# Patient Record
Sex: MALE | Race: BLACK OR AFRICAN AMERICAN | NOT HISPANIC OR LATINO | Employment: OTHER | URBAN - METROPOLITAN AREA
[De-identification: names, ages, dates, MRNs, and addresses within clinical notes are randomized per-mention and may not be internally consistent; named-entity substitution may affect disease eponyms.]

---

## 2022-01-13 DIAGNOSIS — M25.519 SHOULDER PAIN: Primary | ICD-10-CM

## 2022-05-02 DIAGNOSIS — M54.50 LOW BACK PAIN: Primary | ICD-10-CM

## 2022-06-03 ENCOUNTER — CLINICAL SUPPORT (OUTPATIENT)
Dept: REHABILITATION | Facility: HOSPITAL | Age: 72
End: 2022-06-03
Payer: OTHER GOVERNMENT

## 2022-06-03 DIAGNOSIS — M25.511 CHRONIC PAIN OF BOTH SHOULDERS: ICD-10-CM

## 2022-06-03 DIAGNOSIS — G89.29 CHRONIC BILATERAL LOW BACK PAIN WITHOUT SCIATICA: Primary | ICD-10-CM

## 2022-06-03 DIAGNOSIS — M25.512 CHRONIC PAIN OF BOTH SHOULDERS: ICD-10-CM

## 2022-06-03 DIAGNOSIS — G89.29 CHRONIC PAIN OF BOTH SHOULDERS: ICD-10-CM

## 2022-06-03 DIAGNOSIS — M54.50 CHRONIC BILATERAL LOW BACK PAIN WITHOUT SCIATICA: Primary | ICD-10-CM

## 2022-06-03 PROCEDURE — 97811 ACUP 1/> W/O ESTIM EA ADD 15: CPT

## 2022-06-03 PROCEDURE — 97810 ACUP 1/> WO ESTIM 1ST 15 MIN: CPT

## 2022-06-03 NOTE — PROGRESS NOTES
"  Acupuncture Evaluation Note     Name: Cole Winston  [facility] Number: [id number]    TCM Diagnosis: Qi and Blood stagnated in Shoulder and Lower Back  Physician: Yanelis Lyons MD    Date of Service: 6/3/2022     Medical Diagnosis: Shoulder Pain and Lower Back Pain  Evaluation Date: 6/3/2022  Plan of Care Certification Period: 12  Visit #/Visits authorized: 1/ 12    Precautions: Standard    Subjective     Chief Concern: Right Frozen shoulder, Left Shoulder Pain, and Lower Back Pain    Onset:  Right Shoulder after injury in 1972 become frozen shoulder, Left Shoulder began to have pain 2 years ago. Lower Back Pain started 2 months ago                 Progression since onset:  has worsened    Aggravating Factors:  movement   Relieving Factors:  rest    Quality:  Aching and Dull    Radiation: None    Severity:  7    Frequency:  continuously     Treatments Tried:  Therapy  and Surgery    Diet/Taste/Fluids:  in general, a "healthy" diet  /is drinking plenty of fluids/    Digestion:  WNL    Head and Body: WNL    Sleep: Not good because of Pain    Exercise:  Do some exercise    Energy Levels:  6 / 10    Emotional Symptoms:  None      Objective     Tongue:  Red has teeth quan with white fur    Pulse:  Deep    Observation:  Skin in the Shoulder and Back is very hard    Treatment     Treatment:  Invigorate the Qi and Blood in Shoulder and Lower Back    Acupuncture points used:    Bilateral: UB 25, UB 26, UB 27, Niraj Felipe,    Left: GB 21, LI 14, LI 15, LI 16, Windy around left shoulder 3+    NEEDLES IN: 15    NEEDLES OUT: 15    NEEDLES W/O STIM  AT: 10:40 AM    NEEDLES W/O STIM REMOVED AT: !1: 10 AM      Assessment     1. Patient felt good     Cause of Condition: Injury and chronic muscle damage    Associated Risk Factors:  None    Traditional Chinese Medicine Diagnosis:  Qi and Blood stagnated in Shoulder and Lower Back    Patient prognosis is Fair. Because the right shoulder has become frozen for too long time    Patient will " continue to benefit from acupuncture treatment to address the deficits listed in the problem list box on initial evaluation, provide patient family education and to maximize pt's level of independence in the home and community environment.     Patient's spiritual, cultural and educational needs considered and pt agreeable to plan of care and goals.     Anticipated barriers to treatment: None    Plan     Recommend 1 sessions per week for 12 sessions then re-assess.      Recommendations:  PT    Education:  Patient understands the nature of Acupuncture Treatment

## 2022-06-09 ENCOUNTER — CLINICAL SUPPORT (OUTPATIENT)
Dept: REHABILITATION | Facility: HOSPITAL | Age: 72
End: 2022-06-09
Payer: OTHER GOVERNMENT

## 2022-06-09 DIAGNOSIS — M25.511 CHRONIC PAIN OF BOTH SHOULDERS: ICD-10-CM

## 2022-06-09 DIAGNOSIS — M54.50 CHRONIC BILATERAL LOW BACK PAIN WITHOUT SCIATICA: Primary | ICD-10-CM

## 2022-06-09 DIAGNOSIS — G89.29 CHRONIC PAIN OF BOTH SHOULDERS: ICD-10-CM

## 2022-06-09 DIAGNOSIS — G89.29 CHRONIC BILATERAL LOW BACK PAIN WITHOUT SCIATICA: Primary | ICD-10-CM

## 2022-06-09 DIAGNOSIS — M25.512 CHRONIC PAIN OF BOTH SHOULDERS: ICD-10-CM

## 2022-06-09 PROCEDURE — 97811 ACUP 1/> W/O ESTIM EA ADD 15: CPT

## 2022-06-09 PROCEDURE — 97810 ACUP 1/> WO ESTIM 1ST 15 MIN: CPT

## 2022-06-09 NOTE — PROGRESS NOTES
Acupuncture Treatment Note     Name: Cole Winston  [facility] Number: [id number]    Traditional Chinese Medicine Diagnosis:  Qi and Blood stagnated in Shoulder and Lower Back  Physician: Yanelis Lyons MD    Date of Service: 6/9/2022     Medical Diagnosis: Shoulder Pain and Lower Back Pain    Evaluation Date: 6/3/2022    Plan of Care Certification Period: 12  Visit #/Visits authorized: 2 / 12     Precautions: Diabetes    Subjective     Chief Complaint:  Right Frozen shoulder, Left Shoulder Pain, and Lower Back Pain    Response to Previous Treatment:  Left Shoulder pain reduced a lot    Quality of Symptoms (Better/Worse):  Better    Other Condition/Symptoms:  Since the patient has long time Diabetes, his skin on Back Shoulder become very hard.    Objective      New Findings:  None    Treatment Principles:  Invigorate the Qi and Blood in Shoulder and Lower Back    Acupuncture Points:     Bilateral: UB 25, UB 26, UB 27, UB 28, UB 29, UB 30, Niraj Felipe, Phillip Tuo Oliva Ji + 4 , UB 54, GB 30, Windy around GB30 +4, GB 31    Left: GB 21, LI 14, LI 15, LI 16, Windy around left shoulder 2+    Right: GB32, GB 32    NEEDLES W/O STIM  AT: 10 40 AM    NEEDLES W/O STIM REMOVED AT: 11: 20 AM    #NEEDLES IN: 44    #NEEDLES OUT: 44    Other Traditional Chinese Medicine Modalities:  Cupping    Recommendations:  PT exercise    Education:  Patient is aware of cumulative effect of acupuncture      Assessment      Analysis of Treatment:  Very good    Pt prognosis is Good.     Patient will continue to benefit from acupuncture treatment to address the deficits listed in the problem list box on initial evaluation, provide patient family education and to maximize pt's level of independence in the home and community environment.     Patient's spiritual, cultural and educational needs considered and pt agreeable to plan of care and goals.     Anticipated barriers to treatment: None    Plan     Recommend   1-2     /week for12   treatments and  re-assess.

## 2022-06-17 ENCOUNTER — CLINICAL SUPPORT (OUTPATIENT)
Dept: REHABILITATION | Facility: HOSPITAL | Age: 72
End: 2022-06-17
Payer: OTHER GOVERNMENT

## 2022-06-17 DIAGNOSIS — G89.29 CHRONIC PAIN OF BOTH SHOULDERS: ICD-10-CM

## 2022-06-17 DIAGNOSIS — M25.511 CHRONIC PAIN OF BOTH SHOULDERS: ICD-10-CM

## 2022-06-17 DIAGNOSIS — M54.50 CHRONIC BILATERAL LOW BACK PAIN WITHOUT SCIATICA: Primary | ICD-10-CM

## 2022-06-17 DIAGNOSIS — M25.512 CHRONIC PAIN OF BOTH SHOULDERS: ICD-10-CM

## 2022-06-17 DIAGNOSIS — G89.29 CHRONIC BILATERAL LOW BACK PAIN WITHOUT SCIATICA: Primary | ICD-10-CM

## 2022-06-17 PROCEDURE — 97810 ACUP 1/> WO ESTIM 1ST 15 MIN: CPT

## 2022-06-17 PROCEDURE — 97811 ACUP 1/> W/O ESTIM EA ADD 15: CPT

## 2022-06-17 NOTE — PROGRESS NOTES
Acupuncture Treatment Note     Name: Cole Winston  [facility] Number: [id number]    Traditional Chinese Medicine Diagnosis:  Qi and Blood stagnated in Shoulder and Lower Back  Physician: Yanelis Lyons MD    Date of Service: 6/17/2022     Medical Diagnosis: Shoulder Pain and Lower Back Pain    Evaluation Date: 6/3/2022    Plan of Care Certification Period: 12  Visit #/Visits authorized: 3 / 12     Precautions: Diabetes    Subjective     Chief Complaint:  Right Frozen shoulder, Left Shoulder Pain, and Lower Back Pain    Response to Previous Treatment:  Left Shoulder pain reduced a lot    Quality of Symptoms (Better/Worse):  Better    Other Condition/Symptoms:  Since the patient has long time Diabetes, his skin on Back Shoulder become very hard.    Objective      New Findings:  None    Treatment Principles:  Invigorate the Qi and Blood in Shoulder and Lower Back    Acupuncture Points:     Bilateral: UB 25, UB 26, , UB 29, UB 30, Niraj Felipe, Phillip Tuo Oliva Ji + 1 , UB 54, GB 30, Windy around GB30 +3, Neck Oliva Ji + 2    Left: GB 21, LI 14, LI 15, LI 16, Windy around left shoulder 2+    NEEDLES W/O STIM  AT: 11 00 AM    NEEDLES W/O STIM REMOVED AT: 11: 40 AM    #NEEDLES IN: 32    #NEEDLES OUT: 32    Other Traditional Chinese Medicine Modalities:  None    Recommendations:  PT exercise    Education:  Patient is aware of cumulative effect of acupuncture      Assessment      Analysis of Treatment:  Very good    Pt prognosis is Good.     Patient will continue to benefit from acupuncture treatment to address the deficits listed in the problem list box on initial evaluation, provide patient family education and to maximize pt's level of independence in the home and community environment.     Patient's spiritual, cultural and educational needs considered and pt agreeable to plan of care and goals.     Anticipated barriers to treatment: None    Plan     Recommend   1-2     /week for12   treatments and re-assess.

## 2022-06-24 ENCOUNTER — CLINICAL SUPPORT (OUTPATIENT)
Dept: REHABILITATION | Facility: HOSPITAL | Age: 72
End: 2022-06-24
Payer: OTHER GOVERNMENT

## 2022-06-24 DIAGNOSIS — M54.50 CHRONIC BILATERAL LOW BACK PAIN WITHOUT SCIATICA: Primary | ICD-10-CM

## 2022-06-24 DIAGNOSIS — M25.511 CHRONIC PAIN OF BOTH SHOULDERS: ICD-10-CM

## 2022-06-24 DIAGNOSIS — G89.29 CHRONIC PAIN OF BOTH SHOULDERS: ICD-10-CM

## 2022-06-24 DIAGNOSIS — M25.512 CHRONIC PAIN OF BOTH SHOULDERS: ICD-10-CM

## 2022-06-24 DIAGNOSIS — G89.29 CHRONIC BILATERAL LOW BACK PAIN WITHOUT SCIATICA: Primary | ICD-10-CM

## 2022-06-24 PROCEDURE — 97811 ACUP 1/> W/O ESTIM EA ADD 15: CPT

## 2022-06-24 PROCEDURE — 97810 ACUP 1/> WO ESTIM 1ST 15 MIN: CPT

## 2022-06-24 NOTE — PROGRESS NOTES
Acupuncture Treatment Note     Name: Cole Winston  [facility] Number: [id number]    Traditional Chinese Medicine Diagnosis:  Qi and Blood stagnated in Shoulder and Lower Back  Physician: Yanelis Lyons MD    Date of Service: 6/24/2022     Medical Diagnosis: Shoulder Pain and Lower Back Pain    Evaluation Date: 6/3/2022    Plan of Care Certification Period: 12  Visit #/Visits authorized: 4 / 12     Precautions: Diabetes    Subjective     Chief Complaint:  Right Frozen shoulder, Left Shoulder Pain, and Lower Back Pain    Response to Previous Treatment:  Left Shoulder pain reduced a lot    Quality of Symptoms (Better/Worse):  Better    Other Condition/Symptoms:  Since the patient has long time Diabetes, his skin on Back Shoulder become very hard.    Objective      New Findings:  None    Treatment Principles:  Invigorate the Qi and Blood in Shoulder and Lower Back    Acupuncture Points:     Bilateral: UB 25, UB 26, , UB 29, UB 30, Niraj Felipe, Phillip Tuo Oliva Ji + 1 , UB 54, GB 30, GB 21, LI 14, LI 15, LI 16,    Right : Windy around right shoulder 5+    NEEDLES W/O STIM  AT: 10 30 AM    NEEDLES W/O STIM REMOVED AT: 11: 10 AM    #NEEDLES IN: 29    #NEEDLES OUT: 29    Other Traditional Chinese Medicine Modalities:  None    Recommendations:  PT exercise    Education:  Patient is aware of cumulative effect of acupuncture      Assessment      Analysis of Treatment:  Very good    Pt prognosis is Good.     Patient will continue to benefit from acupuncture treatment to address the deficits listed in the problem list box on initial evaluation, provide patient family education and to maximize pt's level of independence in the home and community environment.     Patient's spiritual, cultural and educational needs considered and pt agreeable to plan of care and goals.     Anticipated barriers to treatment: None    Plan     Recommend   1-2     /week for12   treatments and re-assess.

## 2022-06-30 ENCOUNTER — CLINICAL SUPPORT (OUTPATIENT)
Dept: REHABILITATION | Facility: HOSPITAL | Age: 72
End: 2022-06-30
Payer: OTHER GOVERNMENT

## 2022-06-30 DIAGNOSIS — M25.511 CHRONIC PAIN OF BOTH SHOULDERS: ICD-10-CM

## 2022-06-30 DIAGNOSIS — M54.50 CHRONIC BILATERAL LOW BACK PAIN WITHOUT SCIATICA: Primary | ICD-10-CM

## 2022-06-30 DIAGNOSIS — G89.29 CHRONIC BILATERAL LOW BACK PAIN WITHOUT SCIATICA: Primary | ICD-10-CM

## 2022-06-30 DIAGNOSIS — M25.512 CHRONIC PAIN OF BOTH SHOULDERS: ICD-10-CM

## 2022-06-30 DIAGNOSIS — G89.29 CHRONIC PAIN OF BOTH SHOULDERS: ICD-10-CM

## 2022-06-30 PROCEDURE — 97811 ACUP 1/> W/O ESTIM EA ADD 15: CPT

## 2022-06-30 PROCEDURE — 97810 ACUP 1/> WO ESTIM 1ST 15 MIN: CPT

## 2022-06-30 NOTE — PROGRESS NOTES
Acupuncture Treatment Note     Name: Cole Winston  [facility] Number: [id number]    Traditional Chinese Medicine Diagnosis:  Qi and Blood stagnated in Shoulder and Lower Back  Physician: Yanelis Lyons MD    Date of Service: 6/30/2022     Medical Diagnosis: Shoulder Pain and Lower Back Pain    Evaluation Date: 6/3/2022    Plan of Care Certification Period: 12  Visit #/Visits authorized: 5 / 12     Precautions: Diabetes    Subjective     Chief Complaint:  Right Frozen shoulder, Left Shoulder Pain, and Lower Back Pain    Response to Previous Treatment:  Left Shoulder pain reduced a lot    Quality of Symptoms (Better/Worse):  Better    Other Condition/Symptoms:  Since the patient has long time Diabetes, his skin on  Shoulder become very hard.    Objective      New Findings:  None    Treatment Principles:  Invigorate the Qi and Blood in Shoulder and Lower Back    Acupuncture Points:     Bilateral: UB 25, UB 26, ,  Niraj Felipe, Phillip Omkaro Oliva Ji + 1 , UB 54, GB 30, GB 21, LI 15, LI 16,    Right : Windy around right shoulder 5+    NEEDLES W/O STIM  AT: 9 40 AM    NEEDLES W/O STIM REMOVED AT: 10: 20 AM    #NEEDLES IN: 23    #NEEDLES OUT: 23    Other Traditional Chinese Medicine Modalities:  None    Recommendations:  PT exercise    Education:  Patient is aware of cumulative effect of acupuncture      Assessment      Analysis of Treatment:  Very good    Pt prognosis is Good.     Patient will continue to benefit from acupuncture treatment to address the deficits listed in the problem list box on initial evaluation, provide patient family education and to maximize pt's level of independence in the home and community environment.     Patient's spiritual, cultural and educational needs considered and pt agreeable to plan of care and goals.     Anticipated barriers to treatment: None    Plan     Recommend   1-2     /week for12   treatments and re-assess.

## 2022-07-29 ENCOUNTER — CLINICAL SUPPORT (OUTPATIENT)
Dept: REHABILITATION | Facility: HOSPITAL | Age: 72
End: 2022-07-29
Payer: OTHER GOVERNMENT

## 2022-07-29 DIAGNOSIS — M25.512 CHRONIC PAIN OF BOTH SHOULDERS: ICD-10-CM

## 2022-07-29 DIAGNOSIS — M54.50 CHRONIC BILATERAL LOW BACK PAIN WITHOUT SCIATICA: Primary | ICD-10-CM

## 2022-07-29 DIAGNOSIS — G89.29 CHRONIC BILATERAL LOW BACK PAIN WITHOUT SCIATICA: Primary | ICD-10-CM

## 2022-07-29 DIAGNOSIS — M25.511 CHRONIC PAIN OF BOTH SHOULDERS: ICD-10-CM

## 2022-07-29 DIAGNOSIS — G89.29 CHRONIC PAIN OF BOTH SHOULDERS: ICD-10-CM

## 2022-07-29 PROCEDURE — 97811 ACUP 1/> W/O ESTIM EA ADD 15: CPT

## 2022-07-29 PROCEDURE — 97810 ACUP 1/> WO ESTIM 1ST 15 MIN: CPT

## 2022-07-29 NOTE — PROGRESS NOTES
Acupuncture Treatment Note     Name: Cole Winston  [facility] Number: [id number]    Traditional Chinese Medicine Diagnosis:  Qi and Blood stagnated in  Shoulder and Lower Back  Physician: Yanelis Lyons MD    Date of Service: 7/29/2022     Medical Diagnosis: Shoulder Pain and Lower Back Pain    Evaluation Date: 6/3/2022    Plan of Care Certification Period: 12  Visit #/Visits authorized: 6 / 12     Precautions: Diabetes    Subjective     Chief Complaint:  Right Frozen shoulder, Left Shoulder Pain, and Lower Back Pain    Response to Previous Treatment:  Left Shoulder pain reduced a lot    Quality of Symptoms (Better/Worse):  Better    Other Condition/Symptoms:  Since the patient has long time Diabetes, his skin on  Shoulder become very hard.    Objective      New Findings:  None    Treatment Principles:  Invigorate the Qi and Blood in Shoulder and Lower Back    Acupuncture Points:     Bilateral: UB 25, UB 26, ,  Niraj Felipe, Phillip Tuo Oliva Ji + 1 , UB 54, GB 30,     Leftt : Windy around left shoulder 8+ GB 21    NEEDLES W/O STIM  AT: 10 00 AM    NEEDLES W/O STIM REMOVED AT: 10: 40 AM    #NEEDLES IN: 18    #NEEDLES OUT: 18    Other Traditional Chinese Medicine Modalities:  None    Recommendations:  PT exercise    Education:  Patient is aware of cumulative effect of acupuncture      Assessment      Analysis of Treatment:  Very good    Pt prognosis is Good.     Patient will continue to benefit from acupuncture treatment to address the deficits listed in the problem list box on initial evaluation, provide patient family education and to maximize pt's level of independence in the home and community environment.     Patient's spiritual, cultural and educational needs considered and pt agreeable to plan of care and goals.     Anticipated barriers to treatment: None    Plan     Recommend   1-2     /week for12   treatments and re-assess.

## 2022-08-12 ENCOUNTER — CLINICAL SUPPORT (OUTPATIENT)
Dept: REHABILITATION | Facility: HOSPITAL | Age: 72
End: 2022-08-12
Payer: OTHER GOVERNMENT

## 2022-08-12 DIAGNOSIS — M25.512 CHRONIC LEFT SHOULDER PAIN: ICD-10-CM

## 2022-08-12 DIAGNOSIS — M54.50 CHRONIC LEFT-SIDED LOW BACK PAIN WITHOUT SCIATICA: Primary | ICD-10-CM

## 2022-08-12 DIAGNOSIS — G89.29 CHRONIC LEFT SHOULDER PAIN: ICD-10-CM

## 2022-08-12 DIAGNOSIS — G89.29 CHRONIC LEFT-SIDED LOW BACK PAIN WITHOUT SCIATICA: Primary | ICD-10-CM

## 2022-08-12 PROCEDURE — 97811 ACUP 1/> W/O ESTIM EA ADD 15: CPT

## 2022-08-12 PROCEDURE — 97810 ACUP 1/> WO ESTIM 1ST 15 MIN: CPT

## 2022-08-12 NOTE — PROGRESS NOTES
Acupuncture Treatment Note     Name: Cole Winston  [facility] Number: [id number]    Traditional Chinese Medicine Diagnosis:  Qi and Blood stagnated in  Left Shoulder and Left Lower Back  Physician: Yanelis Lyons MD    Date of Service: 8/12/2022     Medical Diagnosis: Left Shoulder Pain and Left Lower Back Pain    Evaluation Date: 6/3/2022    Plan of Care Certification Period: 12  Visit #/Visits authorized: 7 / 12     Precautions: Diabetes    Subjective     Chief Complaint:  Left Shoulder Pain, and Left Lower Back Pain    Response to Previous Treatment:  Good  Quality of Symptoms (Better/Worse):  Better    Other Condition/Symptoms:  None    Objective      New Findings:  None    Treatment Principles:  Invigorate the Qi and Blood in Left Shoulder and in Left Lower Back    Acupuncture Points:     Left :  Phillip Tuo Oliva Ji + 3 , UB 54, GB 30, Windy on left lower back + 4,  Windy around left shoulder 8+ GB 21    NEEDLES W/O STIM  AT: 11 00 AM    NEEDLES W/O STIM REMOVED AT: 11: 30 AM    #NEEDLES IN: 18    #NEEDLES OUT: 18    Other Traditional Chinese Medicine Modalities:  None    Recommendations:  PT exercise    Education:  Patient is aware of cumulative effect of acupuncture      Assessment      Analysis of Treatment:  Very good    Pt prognosis is Good.     Patient will continue to benefit from acupuncture treatment to address the deficits listed in the problem list box on initial evaluation, provide patient family education and to maximize pt's level of independence in the home and community environment.     Patient's spiritual, cultural and educational needs considered and pt agreeable to plan of care and goals.     Anticipated barriers to treatment: None    Plan     Recommend   1-2     /week for12   treatments and re-assess.

## 2022-08-19 ENCOUNTER — CLINICAL SUPPORT (OUTPATIENT)
Dept: REHABILITATION | Facility: HOSPITAL | Age: 72
End: 2022-08-19
Payer: OTHER GOVERNMENT

## 2022-08-19 DIAGNOSIS — M54.50 CHRONIC LEFT-SIDED LOW BACK PAIN WITHOUT SCIATICA: Primary | ICD-10-CM

## 2022-08-19 DIAGNOSIS — M25.512 CHRONIC LEFT SHOULDER PAIN: ICD-10-CM

## 2022-08-19 DIAGNOSIS — G89.29 CHRONIC LEFT-SIDED LOW BACK PAIN WITHOUT SCIATICA: Primary | ICD-10-CM

## 2022-08-19 DIAGNOSIS — G89.29 CHRONIC LEFT SHOULDER PAIN: ICD-10-CM

## 2022-08-19 PROCEDURE — 97811 ACUP 1/> W/O ESTIM EA ADD 15: CPT

## 2022-08-19 PROCEDURE — 97810 ACUP 1/> WO ESTIM 1ST 15 MIN: CPT

## 2022-08-19 NOTE — PROGRESS NOTES
Acupuncture Treatment Note     Name: Cole Winston  [facility] Number: [id number]    Traditional Chinese Medicine Diagnosis:  Qi and Blood stagnated in  Left Shoulder and Left Lower Back  Physician: Yanelis Lyons MD    Date of Service: 8/19/2022     Medical Diagnosis: Left Shoulder Pain and Left Lower Back Pain    Evaluation Date: 6/3/2022    Plan of Care Certification Period: 12  Visit #/Visits authorized: 8 / 12     Precautions: Diabetes    Subjective     Chief Complaint:  Left Shoulder Pain, and Left Lower Back Pain    Response to Previous Treatment:  Good  Quality of Symptoms (Better/Worse):  Better    Other Condition/Symptoms:  None    Objective      New Findings:  None    Treatment Principles:  Invigorate the Qi and Blood in Left Shoulder and in Left Lower Back    Acupuncture Points:     Left :  Phillip Tuo Oliva Ji + 1 , UB 54, GB 30, Windy on left lower back + 4,  Windy around left shoulder 8+ GB 21    NEEDLES W/O STIM  AT: 11 00 AM    NEEDLES W/O STIM REMOVED AT: 11: 30 AM    #NEEDLES IN: 16    #NEEDLES OUT: 16    Other Traditional Chinese Medicine Modalities:  None    Recommendations:  PT exercise    Education:  Patient is aware of cumulative effect of acupuncture      Assessment      Analysis of Treatment:  Very good    Pt prognosis is Good.     Patient will continue to benefit from acupuncture treatment to address the deficits listed in the problem list box on initial evaluation, provide patient family education and to maximize pt's level of independence in the home and community environment.     Patient's spiritual, cultural and educational needs considered and pt agreeable to plan of care and goals.     Anticipated barriers to treatment: None    Plan     Recommend   1-2     /week for12   treatments and re-assess.

## 2022-08-26 ENCOUNTER — CLINICAL SUPPORT (OUTPATIENT)
Dept: REHABILITATION | Facility: HOSPITAL | Age: 72
End: 2022-08-26
Payer: OTHER GOVERNMENT

## 2022-08-26 DIAGNOSIS — M54.50 CHRONIC LEFT-SIDED LOW BACK PAIN WITHOUT SCIATICA: Primary | ICD-10-CM

## 2022-08-26 DIAGNOSIS — M25.512 CHRONIC LEFT SHOULDER PAIN: ICD-10-CM

## 2022-08-26 DIAGNOSIS — G89.29 CHRONIC LEFT-SIDED LOW BACK PAIN WITHOUT SCIATICA: Primary | ICD-10-CM

## 2022-08-26 DIAGNOSIS — G89.29 CHRONIC LEFT SHOULDER PAIN: ICD-10-CM

## 2022-08-26 PROCEDURE — 97810 ACUP 1/> WO ESTIM 1ST 15 MIN: CPT

## 2022-08-26 PROCEDURE — 97811 ACUP 1/> W/O ESTIM EA ADD 15: CPT

## 2022-08-26 NOTE — PROGRESS NOTES
Acupuncture Treatment Note     Name: Cole Winston  [facility] Number: [id number]    Traditional Chinese Medicine Diagnosis:  Qi and Blood stagnated in  Left Shoulder and Left Lower Back  Physician: Yanelis Lyons MD    Date of Service: 8/26/2022     Medical Diagnosis: Left Shoulder Pain and Left Lower Back Pain    Evaluation Date: 6/3/2022    Plan of Care Certification Period: 12  Visit #/Visits authorized: 9 / 12     Precautions: Diabetes    Subjective     Chief Complaint:  Left Shoulder Pain, and Left Lower Back Pain    Response to Previous Treatment:  Good  Quality of Symptoms (Better/Worse):  Better    Other Condition/Symptoms:  None    Objective      New Findings:  None    Treatment Principles:  Invigorate the Qi and Blood in Left Shoulder and in Left Lower Back    Acupuncture Points:     Left :  Phillip Tuo Oliva Ji + 1 , UB 54, GB 30, Windy on left lower back + 5,  Windy around left shoulder +8,  GB 21    NEEDLES W/O STIM  AT: 11 10 AM    NEEDLES W/O STIM REMOVED AT: 11: 40 AM    #NEEDLES IN: 17    #NEEDLES OUT: 17    Other Traditional Chinese Medicine Modalities:  None    Recommendations:  PT exercise    Education:  Patient is aware of cumulative effect of acupuncture      Assessment      Analysis of Treatment:  Very good    Pt prognosis is Good.     Patient will continue to benefit from acupuncture treatment to address the deficits listed in the problem list box on initial evaluation, provide patient family education and to maximize pt's level of independence in the home and community environment.     Patient's spiritual, cultural and educational needs considered and pt agreeable to plan of care and goals.     Anticipated barriers to treatment: None    Plan     Recommend   1-2     /week for12   treatments and re-assess.

## 2022-09-02 ENCOUNTER — CLINICAL SUPPORT (OUTPATIENT)
Dept: REHABILITATION | Facility: HOSPITAL | Age: 72
End: 2022-09-02
Payer: OTHER GOVERNMENT

## 2022-09-02 DIAGNOSIS — M54.50 CHRONIC LEFT-SIDED LOW BACK PAIN WITHOUT SCIATICA: Primary | ICD-10-CM

## 2022-09-02 DIAGNOSIS — M25.512 CHRONIC LEFT SHOULDER PAIN: ICD-10-CM

## 2022-09-02 DIAGNOSIS — G89.29 CHRONIC LEFT-SIDED LOW BACK PAIN WITHOUT SCIATICA: Primary | ICD-10-CM

## 2022-09-02 DIAGNOSIS — G89.29 CHRONIC LEFT SHOULDER PAIN: ICD-10-CM

## 2022-09-02 PROCEDURE — 97810 ACUP 1/> WO ESTIM 1ST 15 MIN: CPT

## 2022-09-02 PROCEDURE — 97811 ACUP 1/> W/O ESTIM EA ADD 15: CPT

## 2022-09-02 NOTE — PROGRESS NOTES
Acupuncture Treatment Note     Name: Cole Winston  [facility] Number: [id number]    Traditional Chinese Medicine Diagnosis:  Qi and Blood stagnated in  Left Shoulder and Left Lower Back  Physician: Yanelis Lyons MD    Date of Service: 9/2/2022     Medical Diagnosis: Left Shoulder Pain and Left Lower Back Pain    Evaluation Date: 6/3/2022    Plan of Care Certification Period: 12  Visit #/Visits authorized: 10 / 12     Precautions: Diabetes    Subjective     Chief Complaint:  Left Shoulder Pain, and Left Lower Back Pain    Response to Previous Treatment:  Good  Quality of Symptoms (Better/Worse):  Better    Other Condition/Symptoms:  None    Objective      New Findings:  None    Treatment Principles:  Invigorate the Qi and Blood in Left Shoulder and in Left Lower Back    Acupuncture Points:     Left :  Phillip Tuo Oliva Ji + 1 , UB 54, GB 30, Windy on left lower back + 5,  Windy around left shoulder +8,  GB 21    NEEDLES W/O STIM  AT: 11 10 AM    NEEDLES W/O STIM REMOVED AT: 11: 40 AM    #NEEDLES IN: 17    #NEEDLES OUT: 17    Other Traditional Chinese Medicine Modalities:  None    Recommendations:  PT exercise    Education:  Patient is aware of cumulative effect of acupuncture      Assessment      Analysis of Treatment:  Very good    Pt prognosis is Good.     Patient will continue to benefit from acupuncture treatment to address the deficits listed in the problem list box on initial evaluation, provide patient family education and to maximize pt's level of independence in the home and community environment.     Patient's spiritual, cultural and educational needs considered and pt agreeable to plan of care and goals.     Anticipated barriers to treatment: None    Plan     Recommend   1-2     /week for12   treatments and re-assess.

## 2022-09-09 ENCOUNTER — CLINICAL SUPPORT (OUTPATIENT)
Dept: REHABILITATION | Facility: HOSPITAL | Age: 72
End: 2022-09-09
Payer: OTHER GOVERNMENT

## 2022-09-09 DIAGNOSIS — M25.512 CHRONIC LEFT SHOULDER PAIN: Primary | ICD-10-CM

## 2022-09-09 DIAGNOSIS — G89.29 CHRONIC LEFT SHOULDER PAIN: Primary | ICD-10-CM

## 2022-09-09 PROCEDURE — 97811 ACUP 1/> W/O ESTIM EA ADD 15: CPT

## 2022-09-09 PROCEDURE — 97810 ACUP 1/> WO ESTIM 1ST 15 MIN: CPT

## 2022-09-09 NOTE — PROGRESS NOTES
Acupuncture Treatment Note     Name: Cole Winston  [facility] Number: [id number]    Traditional Chinese Medicine Diagnosis:  Qi and Blood stagnated in  Left Shoulder     Physician: [facility]    Date of Service: 9/9/2022     Medical Diagnosis: Left Shoulder Pain   Evaluation Date: 6/3/2022    Plan of Care Certification Period: 12  Visit #/Visits authorized: 11 / 12     Precautions: Diabetes    Subjective     Chief Complaint:  Left Shoulder Pain,     Response to Previous Treatment:  Good  Quality of Symptoms (Better/Worse):  Better    Other Condition/Symptoms:  None    Objective      New Findings:  None    Treatment Principles:  Invigorate the Qi and Blood in Left Shoulder     Acupuncture Points:     Left :   Windy around left shoulder +8,  GB 21    NEEDLES W/O STIM  AT: 11 10 AM    NEEDLES W/O STIM REMOVED AT: 11: 40 AM    #NEEDLES IN: 9    #NEEDLES OUT: 9    Other Traditional Chinese Medicine Modalities:  None    Recommendations:  PT exercise    Education:  Patient is aware of cumulative effect of acupuncture      Assessment      Analysis of Treatment:  Very good    Pt prognosis is Good.     Patient will continue to benefit from acupuncture treatment to address the deficits listed in the problem list box on initial evaluation, provide patient family education and to maximize pt's level of independence in the home and community environment.     Patient's spiritual, cultural and educational needs considered and pt agreeable to plan of care and goals.     Anticipated barriers to treatment: None    Plan     Recommend   1-2     /week for12   treatments and re-assess.

## 2022-09-23 ENCOUNTER — CLINICAL SUPPORT (OUTPATIENT)
Dept: REHABILITATION | Facility: HOSPITAL | Age: 72
End: 2022-09-23
Payer: OTHER GOVERNMENT

## 2022-09-23 DIAGNOSIS — G89.29 CHRONIC LEFT SHOULDER PAIN: Primary | ICD-10-CM

## 2022-09-23 DIAGNOSIS — M25.512 CHRONIC LEFT SHOULDER PAIN: Primary | ICD-10-CM

## 2022-09-23 PROCEDURE — 97811 ACUP 1/> W/O ESTIM EA ADD 15: CPT

## 2022-09-23 PROCEDURE — 97810 ACUP 1/> WO ESTIM 1ST 15 MIN: CPT

## 2022-09-23 NOTE — PROGRESS NOTES
Acupuncture Treatment Note     Name: Cole Winston  [facility] Number: [id number]    Traditional Chinese Medicine Diagnosis:  Qi and Blood stagnated in  Left Shoulder     Physician: [facility]    Date of Service: 9/23/2022     Medical Diagnosis: Left Shoulder Pain   Evaluation Date: 6/3/2022    Plan of Care Certification Period: 12  Visit #/Visits authorized: 12 / 12     Precautions: Diabetes    Subjective     Chief Complaint:  Left Shoulder Pain,     Response to Previous Treatment:  Good  Quality of Symptoms (Better/Worse):  Better    Other Condition/Symptoms:  None    Objective      New Findings:  None    Treatment Principles:  Invigorate the Qi and Blood in Left Shoulder     Acupuncture Points:     Left :   Windy around left shoulder +10,  GB 21    NEEDLES W/O STIM  AT: 11 10 AM    NEEDLES W/O STIM REMOVED AT: 11: 40 AM    #NEEDLES IN: 11    #NEEDLES OUT: 11    Other Traditional Chinese Medicine Modalities:  None    Recommendations:  PT exercise    Education:  Patient is aware of cumulative effect of acupuncture      Assessment      Analysis of Treatment:  Very good    Pt prognosis is Good.     Patient will continue to benefit from acupuncture treatment to address the deficits listed in the problem list box on initial evaluation, provide patient family education and to maximize pt's level of independence in the home and community environment.     Patient's spiritual, cultural and educational needs considered and pt agreeable to plan of care and goals.     Anticipated barriers to treatment: None    Plan     Recommend   1-2     /week for12   treatments and re-assess.

## 2022-10-14 ENCOUNTER — CLINICAL SUPPORT (OUTPATIENT)
Dept: REHABILITATION | Facility: HOSPITAL | Age: 72
End: 2022-10-14
Payer: OTHER GOVERNMENT

## 2022-10-14 DIAGNOSIS — G89.29 CHRONIC LEFT SHOULDER PAIN: Primary | ICD-10-CM

## 2022-10-14 DIAGNOSIS — M25.512 CHRONIC LEFT SHOULDER PAIN: Primary | ICD-10-CM

## 2022-10-14 PROCEDURE — 97810 ACUP 1/> WO ESTIM 1ST 15 MIN: CPT

## 2022-10-14 PROCEDURE — 97811 ACUP 1/> W/O ESTIM EA ADD 15: CPT

## 2022-10-14 NOTE — PROGRESS NOTES
Acupuncture Treatment Note     Name: Cole Winston  [facility] Number: [id number]    Traditional Chinese Medicine Diagnosis:  Qi and Blood stagnated in  Left Shoulder     Physician: [facility]    Date of Service: 10/14/2022     Medical Diagnosis: Left Shoulder Pain   Evaluation Date: 6/3/2022    Plan of Care Certification Period: 20  Visit #/Visits authorized: 13 / 20    Precautions: Diabetes    Subjective     Chief Complaint:  Left Shoulder Pain,     Response to Previous Treatment:  Good  Quality of Symptoms (Better/Worse):  Better    Other Condition/Symptoms:  None    Objective      New Findings:  None    Treatment Principles:  Invigorate the Qi and Blood in Left Shoulder     Acupuncture Points:     Left :   Windy around left shoulder +10,  GB 21    NEEDLES W/O STIM  AT: 11 10 AM    NEEDLES W/O STIM REMOVED AT: 11: 40 AM    #NEEDLES IN: 11    #NEEDLES OUT: 11    Other Traditional Chinese Medicine Modalities:  None    Recommendations:  PT exercise    Education:  Patient is aware of cumulative effect of acupuncture      Assessment      Analysis of Treatment:  Very good    Pt prognosis is Good.     Patient will continue to benefit from acupuncture treatment to address the deficits listed in the problem list box on initial evaluation, provide patient family education and to maximize pt's level of independence in the home and community environment.     Patient's spiritual, cultural and educational needs considered and pt agreeable to plan of care and goals.     Anticipated barriers to treatment: None    Plan     Recommend   1-2     /week for12   treatments and re-assess.

## 2022-10-21 ENCOUNTER — CLINICAL SUPPORT (OUTPATIENT)
Dept: REHABILITATION | Facility: HOSPITAL | Age: 72
End: 2022-10-21
Payer: OTHER GOVERNMENT

## 2022-10-21 DIAGNOSIS — G89.29 CHRONIC LEFT SHOULDER PAIN: Primary | ICD-10-CM

## 2022-10-21 DIAGNOSIS — M25.512 CHRONIC LEFT SHOULDER PAIN: Primary | ICD-10-CM

## 2022-10-21 NOTE — PROGRESS NOTES
Acupuncture Treatment Note     Name: Cole Winston  [facility] Number: [id number]    Traditional Chinese Medicine Diagnosis:  Qi and Blood stagnated in  Left Shoulder     Physician: [facility]    Date of Service: 10/21/2022     Medical Diagnosis: Left Shoulder Pain   Evaluation Date: 6/3/2022    Plan of Care Certification Period: 20  Visit #/Visits authorized: 14 / 20    Precautions: Diabetes    Subjective     Chief Complaint:  Left Shoulder Pain,     Response to Previous Treatment:  Good  Quality of Symptoms (Better/Worse):  Better    Other Condition/Symptoms:  None    Objective      New Findings:  None    Treatment Principles:  Invigorate the Qi and Blood in Left Shoulder     Acupuncture Points:     Left :   Windy around left shoulder +10,  GB 21    NEEDLES W/O STIM  AT: 11 00 AM    NEEDLES W/O STIM REMOVED AT: 11: 30 AM    #NEEDLES IN: 11    #NEEDLES OUT: 11    Other Traditional Chinese Medicine Modalities:  None    Recommendations:  PT exercise    Education:  Patient is aware of cumulative effect of acupuncture      Assessment      Analysis of Treatment:  Very good    Pt prognosis is Good.     Patient will continue to benefit from acupuncture treatment to address the deficits listed in the problem list box on initial evaluation, provide patient family education and to maximize pt's level of independence in the home and community environment.     Patient's spiritual, cultural and educational needs considered and pt agreeable to plan of care and goals.     Anticipated barriers to treatment: None    Plan     Recommend   1-2     /week for12   treatments and re-assess.

## 2022-11-04 ENCOUNTER — CLINICAL SUPPORT (OUTPATIENT)
Dept: REHABILITATION | Facility: HOSPITAL | Age: 72
End: 2022-11-04
Payer: OTHER GOVERNMENT

## 2022-11-04 DIAGNOSIS — G89.29 CHRONIC LEFT SHOULDER PAIN: Primary | ICD-10-CM

## 2022-11-04 DIAGNOSIS — M25.512 CHRONIC LEFT SHOULDER PAIN: Primary | ICD-10-CM

## 2022-11-04 PROCEDURE — 97810 ACUP 1/> WO ESTIM 1ST 15 MIN: CPT

## 2022-11-04 PROCEDURE — 97811 ACUP 1/> W/O ESTIM EA ADD 15: CPT

## 2022-11-04 NOTE — PROGRESS NOTES
Acupuncture Treatment Note     Name: Cole Winston  [facility] Number: [id number]    Traditional Chinese Medicine Diagnosis:  Qi and Blood stagnated in  Left Shoulder     Physician: [facility]    Date of Service: 11/4/2022     Medical Diagnosis: Left Shoulder Pain   Evaluation Date: 6/3/2022    Plan of Care Certification Period: 20  Visit #/Visits authorized: 15 / 20    Precautions: Diabetes    Subjective     Chief Complaint:  Left Shoulder Pain,     Response to Previous Treatment:  Good  Quality of Symptoms (Better/Worse):  Better    Other Condition/Symptoms:  None    Objective      New Findings:  None    Treatment Principles:  Invigorate the Qi and Blood in Left Shoulder     Acupuncture Points:     Left :   Windy around left shoulder +12,  GB 21    NEEDLES W/O STIM  AT: 11 00 AM    NEEDLES W/O STIM REMOVED AT: 11: 30 AM    #NEEDLES IN: 13    #NEEDLES OUT: 13    Other Traditional Chinese Medicine Modalities:  None    Recommendations:  PT exercise    Education:  Patient is aware of cumulative effect of acupuncture      Assessment      Analysis of Treatment:  Very good    Pt prognosis is Good.     Patient will continue to benefit from acupuncture treatment to address the deficits listed in the problem list box on initial evaluation, provide patient family education and to maximize pt's level of independence in the home and community environment.     Patient's spiritual, cultural and educational needs considered and pt agreeable to plan of care and goals.     Anticipated barriers to treatment: None    Plan     Recommend   1-2     /week for12   treatments and re-assess.

## 2022-11-11 ENCOUNTER — CLINICAL SUPPORT (OUTPATIENT)
Dept: REHABILITATION | Facility: HOSPITAL | Age: 72
End: 2022-11-11
Payer: OTHER GOVERNMENT

## 2022-11-11 DIAGNOSIS — G89.29 CHRONIC LEFT SHOULDER PAIN: Primary | ICD-10-CM

## 2022-11-11 DIAGNOSIS — M25.512 CHRONIC LEFT SHOULDER PAIN: Primary | ICD-10-CM

## 2022-11-11 PROCEDURE — 97810 ACUP 1/> WO ESTIM 1ST 15 MIN: CPT

## 2022-11-11 PROCEDURE — 97811 ACUP 1/> W/O ESTIM EA ADD 15: CPT

## 2022-11-11 NOTE — PROGRESS NOTES
Acupuncture Treatment Note     Name: Cole Winston  [facility] Number: [id number]    Traditional Chinese Medicine Diagnosis:  Qi and Blood stagnated in  Left Shoulder     Physician: [facility]    Date of Service: 11/11/2022     Medical Diagnosis: Left Shoulder Pain   Evaluation Date: 6/3/2022    Plan of Care Certification Period: 20  Visit #/Visits authorized: 16 / 20    Precautions: Diabetes    Subjective     Chief Complaint:  Left Shoulder Pain,     Response to Previous Treatment:  Good  Quality of Symptoms (Better/Worse):  Better    Other Condition/Symptoms:  None    Objective      New Findings:  None    Treatment Principles:  Invigorate the Qi and Blood in Left Shoulder     Acupuncture Points:     Left :   Windy around left shoulder +10,  GB 21    NEEDLES W/O STIM  AT: 11 00 AM    NEEDLES W/O STIM REMOVED AT: 11: 30 AM    #NEEDLES IN: 11    #NEEDLES OUT: 11    Other Traditional Chinese Medicine Modalities:  None    Recommendations:  PT exercise    Education:  Patient is aware of cumulative effect of acupuncture      Assessment      Analysis of Treatment:  Very good    Pt prognosis is Good.     Patient will continue to benefit from acupuncture treatment to address the deficits listed in the problem list box on initial evaluation, provide patient family education and to maximize pt's level of independence in the home and community environment.     Patient's spiritual, cultural and educational needs considered and pt agreeable to plan of care and goals.     Anticipated barriers to treatment: None    Plan     Recommend   1   /week for12   treatments and re-assess.

## 2022-12-09 ENCOUNTER — CLINICAL SUPPORT (OUTPATIENT)
Dept: REHABILITATION | Facility: HOSPITAL | Age: 72
End: 2022-12-09
Payer: OTHER GOVERNMENT

## 2022-12-09 DIAGNOSIS — G89.29 CHRONIC LEFT SHOULDER PAIN: Primary | ICD-10-CM

## 2022-12-09 DIAGNOSIS — M25.512 CHRONIC LEFT SHOULDER PAIN: Primary | ICD-10-CM

## 2022-12-09 PROCEDURE — 97810 ACUP 1/> WO ESTIM 1ST 15 MIN: CPT

## 2022-12-09 PROCEDURE — 97811 ACUP 1/> W/O ESTIM EA ADD 15: CPT

## 2022-12-09 NOTE — PROGRESS NOTES
Acupuncture Treatment Note     Name: Cole Winston  [facility] Number: [id number]    Traditional Chinese Medicine Diagnosis:  Qi and Blood stagnated in  Left Shoulder     Physician: [facility]    Date of Service: 12/9/2022     Medical Diagnosis: Left Shoulder Pain   Evaluation Date: 6/3/2022    Plan of Care Certification Period: 20  Visit #/Visits authorized: 17 / 20    Precautions: Diabetes    Subjective     Chief Complaint:  Left Shoulder Pain,     Response to Previous Treatment:  Good  Quality of Symptoms (Better/Worse):  Better    Other Condition/Symptoms:  None    Objective      New Findings:  None    Treatment Principles:  Invigorate the Qi and Blood in Left Shoulder     Acupuncture Points:     Left :   Windy around left shoulder +11,  GB 21    NEEDLES W/O STIM  AT: 11 00 AM    NEEDLES W/O STIM REMOVED AT: 11: 30 AM    #NEEDLES IN: 12    #NEEDLES OUT: 12    Other Traditional Chinese Medicine Modalities:  None    Recommendations:  PT exercise    Education:  Patient is aware of cumulative effect of acupuncture      Assessment      Analysis of Treatment:  Very good    Pt prognosis is Good.     Patient will continue to benefit from acupuncture treatment to address the deficits listed in the problem list box on initial evaluation, provide patient family education and to maximize pt's level of independence in the home and community environment.     Patient's spiritual, cultural and educational needs considered and pt agreeable to plan of care and goals.     Anticipated barriers to treatment: None    Plan     Recommend   1   /week for12   treatments and re-assess.

## 2022-12-16 ENCOUNTER — CLINICAL SUPPORT (OUTPATIENT)
Dept: REHABILITATION | Facility: HOSPITAL | Age: 72
End: 2022-12-16
Payer: OTHER GOVERNMENT

## 2022-12-16 DIAGNOSIS — M25.512 CHRONIC LEFT SHOULDER PAIN: Primary | ICD-10-CM

## 2022-12-16 DIAGNOSIS — G89.29 CHRONIC LEFT SHOULDER PAIN: Primary | ICD-10-CM

## 2022-12-16 PROCEDURE — 97811 ACUP 1/> W/O ESTIM EA ADD 15: CPT

## 2022-12-16 PROCEDURE — 97810 ACUP 1/> WO ESTIM 1ST 15 MIN: CPT

## 2022-12-16 NOTE — PROGRESS NOTES
Acupuncture Treatment Note     Name: Cole Winston  [facility] Number: [id number]    Traditional Chinese Medicine Diagnosis:  Qi and Blood stagnated in  Left Shoulder     Physician: [facility]    Date of Service: 12/16/2022     Medical Diagnosis: Left Shoulder Pain   Evaluation Date: 6/3/2022    Plan of Care Certification Period: 20  Visit #/Visits authorized: 18 / 20    Precautions: Diabetes    Subjective     Chief Complaint:  Left Shoulder Pain,     Response to Previous Treatment:  Good  Quality of Symptoms (Better/Worse):  Better    Other Condition/Symptoms:  None    Objective      New Findings:  None    Treatment Principles:  Invigorate the Qi and Blood in Left Shoulder     Acupuncture Points:     Left :   Windy around left shoulder +12,  GB 21    NEEDLES W/O STIM  AT: 11 00 AM    NEEDLES W/O STIM REMOVED AT: 11: 30 AM    #NEEDLES IN: 13    #NEEDLES OUT: 13    Other Traditional Chinese Medicine Modalities:  None    Recommendations:  PT exercise    Education:  Patient is aware of cumulative effect of acupuncture      Assessment      Analysis of Treatment:  Very good    Pt prognosis is Good.     Patient will continue to benefit from acupuncture treatment to address the deficits listed in the problem list box on initial evaluation, provide patient family education and to maximize pt's level of independence in the home and community environment.     Patient's spiritual, cultural and educational needs considered and pt agreeable to plan of care and goals.     Anticipated barriers to treatment: None    Plan     Recommend   1   /week for12   treatments and re-assess.

## 2023-01-06 ENCOUNTER — CLINICAL SUPPORT (OUTPATIENT)
Dept: REHABILITATION | Facility: HOSPITAL | Age: 73
End: 2023-01-06
Payer: OTHER GOVERNMENT

## 2023-01-06 DIAGNOSIS — G89.29 CHRONIC LEFT SHOULDER PAIN: Primary | ICD-10-CM

## 2023-01-06 DIAGNOSIS — M25.512 CHRONIC LEFT SHOULDER PAIN: Primary | ICD-10-CM

## 2023-01-06 PROCEDURE — 97810 ACUP 1/> WO ESTIM 1ST 15 MIN: CPT

## 2023-01-06 PROCEDURE — 97811 ACUP 1/> W/O ESTIM EA ADD 15: CPT

## 2023-01-06 NOTE — PROGRESS NOTES
Acupuncture Treatment Note     Name: Cole Winston  [facility] Number: [id number]    Traditional Chinese Medicine Diagnosis:  Qi and Blood stagnated in  Left Shoulder     Physician: [facility] [employer]    Date of Service: 1/6/2023     Medical Diagnosis: Left Shoulder Pain   Evaluation Date: 6/3/2022    Plan of Care Certification Period: 20  Visit #/Visits authorized: 1 / 20    Precautions: Diabetes    Subjective     Chief Complaint:  Left Shoulder Pain,     Response to Previous Treatment:  Good  Quality of Symptoms (Better/Worse):  Better    Other Condition/Symptoms:  None    Objective      New Findings:  None    Treatment Principles:  Invigorate the Qi and Blood in Left Shoulder     Acupuncture Points:     Left :   Windy around left shoulder +12,  GB 21    NEEDLES W/O STIM  AT: 10: 15 AM    NEEDLES W/O STIM REMOVED AT: 10: 45 AM    #NEEDLES IN: 13    #NEEDLES OUT: 13    Other Traditional Chinese Medicine Modalities:  None    Recommendations:  PT exercise    Education:  Patient is aware of cumulative effect of acupuncture      Assessment      Analysis of Treatment:  Very good    Pt prognosis is Good.     Patient will continue to benefit from acupuncture treatment to address the deficits listed in the problem list box on initial evaluation, provide patient family education and to maximize pt's level of independence in the home and community environment.     Patient's spiritual, cultural and educational needs considered and pt agreeable to plan of care and goals.     Anticipated barriers to treatment: None    Plan     Recommend   1   /week for12   treatments and re-assess.

## 2023-01-13 ENCOUNTER — CLINICAL SUPPORT (OUTPATIENT)
Dept: REHABILITATION | Facility: HOSPITAL | Age: 73
End: 2023-01-13
Payer: OTHER GOVERNMENT

## 2023-01-13 DIAGNOSIS — M25.512 CHRONIC LEFT SHOULDER PAIN: Primary | ICD-10-CM

## 2023-01-13 DIAGNOSIS — G89.29 CHRONIC LEFT SHOULDER PAIN: Primary | ICD-10-CM

## 2023-01-13 PROCEDURE — 97811 ACUP 1/> W/O ESTIM EA ADD 15: CPT

## 2023-01-13 PROCEDURE — 97810 ACUP 1/> WO ESTIM 1ST 15 MIN: CPT

## 2023-01-13 NOTE — PROGRESS NOTES
Acupuncture Treatment Note     Name: Cole Winston  [facility] Number: [id number]    Traditional Chinese Medicine Diagnosis:  Qi and Blood stagnated in  Left Shoulder     Physician: [facility]    Date of Service: 1/13/2023     Medical Diagnosis: Left Shoulder Pain   Evaluation Date: 6/3/2022    Plan of Care Certification Period: 20  Visit #/Visits authorized: 2 / 20    Precautions: Diabetes    Subjective     Chief Complaint:  Left Shoulder Pain,     Response to Previous Treatment:  Good  Quality of Symptoms (Better/Worse):  Better    Other Condition/Symptoms:  None    Objective      New Findings:  None    Treatment Principles:  Invigorate the Qi and Blood in Left Shoulder     Acupuncture Points:     Left :   Windy around left shoulder +16,  GB 21    NEEDLES W/O STIM  AT: 10: 15 AM    NEEDLES W/O STIM REMOVED AT: 10: 45 AM    #NEEDLES IN: 17    #NEEDLES OUT: 17    Other Traditional Chinese Medicine Modalities:  None    Recommendations:  PT exercise    Education:  Patient is aware of cumulative effect of acupuncture      Assessment      Analysis of Treatment:  Very good    Pt prognosis is Good.     Patient will continue to benefit from acupuncture treatment to address the deficits listed in the problem list box on initial evaluation, provide patient family education and to maximize pt's level of independence in the home and community environment.     Patient's spiritual, cultural and educational needs considered and pt agreeable to plan of care and goals.     Anticipated barriers to treatment: None    Plan     Recommend   1   /week for   12   treatments and re-assess.

## 2023-01-20 ENCOUNTER — CLINICAL SUPPORT (OUTPATIENT)
Dept: REHABILITATION | Facility: HOSPITAL | Age: 73
End: 2023-01-20
Payer: OTHER GOVERNMENT

## 2023-01-20 DIAGNOSIS — M25.512 CHRONIC LEFT SHOULDER PAIN: Primary | ICD-10-CM

## 2023-01-20 DIAGNOSIS — G89.29 CHRONIC LEFT SHOULDER PAIN: Primary | ICD-10-CM

## 2023-01-20 PROCEDURE — 97811 ACUP 1/> W/O ESTIM EA ADD 15: CPT

## 2023-01-20 PROCEDURE — 97810 ACUP 1/> WO ESTIM 1ST 15 MIN: CPT

## 2023-01-20 NOTE — PROGRESS NOTES
Acupuncture Treatment Note     Name: Cole Winston  [facility] Number: [id number]    Traditional Chinese Medicine Diagnosis:  Qi and Blood stagnated in  Left Shoulder     Physician: [facility] [employer]    Date of Service: 1/20/2023     Medical Diagnosis: Left Shoulder Pain   Evaluation Date: 6/3/2022    Plan of Care Certification Period: 20  Visit #/Visits authorized: 3 / 20    Precautions: Diabetes    Subjective     Chief Complaint:  Left Shoulder Pain,     Response to Previous Treatment:  Good  Quality of Symptoms (Better/Worse):  Better    Other Condition/Symptoms:  None    Objective      New Findings:  None    Treatment Principles:  Invigorate the Qi and Blood in Left Shoulder     Acupuncture Points:     Left :   Windy around left shoulder +17,  GB 21    NEEDLES W/O STIM  AT: 10: 15 AM    NEEDLES W/O STIM REMOVED AT: 10: 45 AM    #NEEDLES IN: 18    #NEEDLES OUT: 18    Other Traditional Chinese Medicine Modalities:  None    Recommendations:  PT exercise    Education:  Patient is aware of cumulative effect of acupuncture      Assessment      Analysis of Treatment:  Very good    Pt prognosis is Good.     Patient will continue to benefit from acupuncture treatment to address the deficits listed in the problem list box on initial evaluation, provide patient family education and to maximize pt's level of independence in the home and community environment.     Patient's spiritual, cultural and educational needs considered and pt agreeable to plan of care and goals.     Anticipated barriers to treatment: None    Plan     Recommend   1   /week for   12   treatments and re-assess.

## 2023-04-05 DIAGNOSIS — M54.50 LOW BACK PAIN: Primary | ICD-10-CM

## 2023-05-05 ENCOUNTER — CLINICAL SUPPORT (OUTPATIENT)
Dept: REHABILITATION | Facility: HOSPITAL | Age: 73
End: 2023-05-05
Payer: OTHER GOVERNMENT

## 2023-05-05 DIAGNOSIS — G89.29 CHRONIC LEFT SHOULDER PAIN: Primary | ICD-10-CM

## 2023-05-05 DIAGNOSIS — M54.50 LOW BACK PAIN: ICD-10-CM

## 2023-05-05 DIAGNOSIS — M25.512 CHRONIC LEFT SHOULDER PAIN: Primary | ICD-10-CM

## 2023-05-05 DIAGNOSIS — M54.50 CHRONIC BILATERAL LOW BACK PAIN WITHOUT SCIATICA: ICD-10-CM

## 2023-05-05 DIAGNOSIS — G89.29 CHRONIC BILATERAL LOW BACK PAIN WITHOUT SCIATICA: ICD-10-CM

## 2023-05-05 PROCEDURE — 97811 ACUP 1/> W/O ESTIM EA ADD 15: CPT

## 2023-05-05 PROCEDURE — 97810 ACUP 1/> WO ESTIM 1ST 15 MIN: CPT

## 2023-05-05 NOTE — PROGRESS NOTES
Acupuncture Treatment Note     Name: Cole Winston  [facility] Number: [id number]    Traditional Chinese Medicine Diagnosis:  Qi and Blood stagnated in  Left Shoulder     Physician: [facility] [employer]    Date of Service: 5/5/2023     Medical Diagnosis: Left Shoulder Pain   Evaluation Date: 6/3/2022    Plan of Care Certification Period: 20  Visit #/Visits authorized: 4 / 20    Precautions: Diabetes    Subjective     Chief Complaint:  Left Shoulder Pain,     Response to Previous Treatment:  Good  Quality of Symptoms (Better/Worse):  Better    Other Condition/Symptoms:  None    Objective      New Findings:  None    Treatment Principles:  Invigorate the Qi and Blood in Left Shoulder     Acupuncture Points:     Left :   Windy around left shoulder +18,  GB 21    NEEDLES W/O STIM  AT: 10: 50 AM    NEEDLES W/O STIM REMOVED AT: 11: 25 AM    #NEEDLES IN: 19    #NEEDLES OUT: 19    Other Traditional Chinese Medicine Modalities:  None    Recommendations:  PT exercise    Education:  Patient is aware of cumulative effect of acupuncture      Assessment      Analysis of Treatment:  Very good    Pt prognosis is Good.     Patient will continue to benefit from acupuncture treatment to address the deficits listed in the problem list box on initial evaluation, provide patient family education and to maximize pt's level of independence in the home and community environment.     Patient's spiritual, cultural and educational needs considered and pt agreeable to plan of care and goals.     Anticipated barriers to treatment: None    Plan     Recommend   1   /week for   12   treatments and re-assess.

## 2023-05-12 ENCOUNTER — CLINICAL SUPPORT (OUTPATIENT)
Dept: REHABILITATION | Facility: HOSPITAL | Age: 73
End: 2023-05-12
Payer: OTHER GOVERNMENT

## 2023-05-12 DIAGNOSIS — G89.29 CHRONIC LEFT SHOULDER PAIN: Primary | ICD-10-CM

## 2023-05-12 DIAGNOSIS — M25.512 CHRONIC LEFT SHOULDER PAIN: Primary | ICD-10-CM

## 2023-05-12 PROCEDURE — 97810 ACUP 1/> WO ESTIM 1ST 15 MIN: CPT

## 2023-05-12 PROCEDURE — 97811 ACUP 1/> W/O ESTIM EA ADD 15: CPT

## 2023-05-12 NOTE — PROGRESS NOTES
Acupuncture Treatment Note     Name: Cole Winston  [facility] Number: [id number]    Traditional Chinese Medicine Diagnosis:  Qi and Blood stagnated in  Left Shoulder     Physician: [facility], [employer]    Date of Service: 5/12/2023     Medical Diagnosis: Left Shoulder Pain   Evaluation Date: 6/3/2022    Plan of Care Certification Period: 20  Visit #/Visits authorized: 5 / 20    Precautions: Diabetes    Subjective     Chief Complaint:  Left Shoulder Pain,     Response to Previous Treatment:  Good  Quality of Symptoms (Better/Worse):  Better    Other Condition/Symptoms:  None    Objective      New Findings:  None    Treatment Principles:  Invigorate the Qi and Blood in Left Shoulder     Acupuncture Points:     Left :   Windy around left shoulder +18,  GB 21    NEEDLES W/O STIM  AT: 10: 50 AM    NEEDLES W/O STIM REMOVED AT: 11: 25 AM    #NEEDLES IN: 19    #NEEDLES OUT: 19    Other Traditional Chinese Medicine Modalities:  None    Recommendations:  PT exercise    Education:  Patient is aware of cumulative effect of acupuncture      Assessment      Analysis of Treatment:  Very good    Pt prognosis is Good.     Patient will continue to benefit from acupuncture treatment to address the deficits listed in the problem list box on initial evaluation, provide patient family education and to maximize pt's level of independence in the home and community environment.     Patient's spiritual, cultural and educational needs considered and pt agreeable to plan of care and goals.     Anticipated barriers to treatment: None    Plan     Recommend   1   /week for   12   treatments and re-assess.

## 2023-05-19 ENCOUNTER — CLINICAL SUPPORT (OUTPATIENT)
Dept: REHABILITATION | Facility: HOSPITAL | Age: 73
End: 2023-05-19
Payer: OTHER GOVERNMENT

## 2023-05-19 DIAGNOSIS — M25.512 CHRONIC LEFT SHOULDER PAIN: Primary | ICD-10-CM

## 2023-05-19 DIAGNOSIS — G89.29 CHRONIC LEFT SHOULDER PAIN: Primary | ICD-10-CM

## 2023-05-19 PROCEDURE — 97811 ACUP 1/> W/O ESTIM EA ADD 15: CPT

## 2023-05-19 PROCEDURE — 97810 ACUP 1/> WO ESTIM 1ST 15 MIN: CPT

## 2023-05-19 NOTE — PROGRESS NOTES
Acupuncture Treatment Note     Name: Cole Winston  [facility] Number: [id number]    Traditional Chinese Medicine Diagnosis:  Qi and Blood stagnated in  Left Shoulder     Physician: [facility], [employer]    Date of Service: 5/19/2023     Medical Diagnosis: Left Shoulder Pain   Evaluation Date: 6/3/2022    Plan of Care Certification Period: 20  Visit #/Visits authorized: 6 / 20    Precautions: Diabetes    Subjective     Chief Complaint:  Left Shoulder Pain,     Response to Previous Treatment:  Good  Quality of Symptoms (Better/Worse):  Better    Other Condition/Symptoms:  None    Objective      New Findings:  None    Treatment Principles:  Invigorate the Qi and Blood in Left Shoulder     Acupuncture Points:     Left :   Windy around left shoulder +18,  GB 21    NEEDLES W/O STIM  AT: 10: 40 AM    NEEDLES W/O STIM REMOVED AT: 11: `5 AM    #NEEDLES IN: 19    #NEEDLES OUT: 19    Other Traditional Chinese Medicine Modalities:  None    Recommendations:  PT exercise    Education:  Patient is aware of cumulative effect of acupuncture      Assessment      Analysis of Treatment:  Very good    Pt prognosis is Good.     Patient will continue to benefit from acupuncture treatment to address the deficits listed in the problem list box on initial evaluation, provide patient family education and to maximize pt's level of independence in the home and community environment.     Patient's spiritual, cultural and educational needs considered and pt agreeable to plan of care and goals.     Anticipated barriers to treatment: None    Plan     Recommend   1   /week for   12   treatments and re-assess.

## 2023-06-09 ENCOUNTER — CLINICAL SUPPORT (OUTPATIENT)
Dept: REHABILITATION | Facility: HOSPITAL | Age: 73
End: 2023-06-09
Payer: OTHER GOVERNMENT

## 2023-06-09 DIAGNOSIS — G89.29 CHRONIC LEFT SHOULDER PAIN: Primary | ICD-10-CM

## 2023-06-09 DIAGNOSIS — M25.512 CHRONIC LEFT SHOULDER PAIN: Primary | ICD-10-CM

## 2023-06-09 PROCEDURE — 97811 ACUP 1/> W/O ESTIM EA ADD 15: CPT

## 2023-06-09 PROCEDURE — 97810 ACUP 1/> WO ESTIM 1ST 15 MIN: CPT

## 2023-06-09 NOTE — PROGRESS NOTES
Acupuncture Treatment Note     Name: Cole Winston  [facility] Number: [id number]    Traditional Chinese Medicine Diagnosis:  Qi and Blood stagnated in  Left Shoulder     Physician: Order, Paper    Date of Service: 6/9/2023     Medical Diagnosis: Left Shoulder Pain   Evaluation Date: 6/3/2022    Plan of Care Certification Period: 20  Visit #/Visits authorized:7 / 20    Precautions: Diabetes    Subjective     Chief Complaint:  Left Shoulder Pain,     Response to Previous Treatment:  Good  Quality of Symptoms (Better/Worse):  Better    Other Condition/Symptoms:  None    Objective      New Findings:  None    Treatment Principles:  Invigorate the Qi and Blood in Left Shoulder     Acupuncture Points:     Left :   Windy around left shoulder +16,  GB 21    NEEDLES W/O STIM  AT: 10: 50 AM    NEEDLES W/O STIM REMOVED AT: 11: 25 AM    #NEEDLES IN: 17    #NEEDLES OUT: 17    Other Traditional Chinese Medicine Modalities:  None    Recommendations:  PT exercise    Education:  Patient is aware of cumulative effect of acupuncture      Assessment      Analysis of Treatment:  Very good    Pt prognosis is Good.     Patient will continue to benefit from acupuncture treatment to address the deficits listed in the problem list box on initial evaluation, provide patient family education and to maximize pt's level of independence in the home and community environment.     Patient's spiritual, cultural and educational needs considered and pt agreeable to plan of care and goals.     Anticipated barriers to treatment: None    Plan     Recommend   1   /week for   12   treatments and re-assess.

## 2023-06-16 ENCOUNTER — CLINICAL SUPPORT (OUTPATIENT)
Dept: REHABILITATION | Facility: HOSPITAL | Age: 73
End: 2023-06-16
Payer: OTHER GOVERNMENT

## 2023-06-16 DIAGNOSIS — M25.512 CHRONIC LEFT SHOULDER PAIN: Primary | ICD-10-CM

## 2023-06-16 DIAGNOSIS — G89.29 CHRONIC LEFT SHOULDER PAIN: Primary | ICD-10-CM

## 2023-06-16 PROCEDURE — 97811 ACUP 1/> W/O ESTIM EA ADD 15: CPT

## 2023-06-16 PROCEDURE — 97810 ACUP 1/> WO ESTIM 1ST 15 MIN: CPT

## 2023-06-16 NOTE — PROGRESS NOTES
Acupuncture Treatment Note     Name: Cole Winston  [facility] Number: [id number]    Traditional Chinese Medicine Diagnosis:  Qi and Blood stagnated in  Left Shoulder     Physician: Order, Paper    Date of Service: 6/16/2023     Medical Diagnosis: Left Shoulder Pain   Evaluation Date: 6/3/2022    Plan of Care Certification Period: 20  Visit #/Visits authorized:8 / 20    Precautions: Diabetes    Subjective     Chief Complaint:  Left Shoulder Pain,     Response to Previous Treatment:  Good  Quality of Symptoms (Better/Worse):  Better    Other Condition/Symptoms:  None    Objective      New Findings:  None    Treatment Principles:  Invigorate the Qi and Blood in Left Shoulder     Acupuncture Points:     Left :   Windy around left shoulder +16,  GB 21    NEEDLES W/O STIM  AT: 10: 50 AM    NEEDLES W/O STIM REMOVED AT: 11: 20 AM    #NEEDLES IN: 17    #NEEDLES OUT: 17    Other Traditional Chinese Medicine Modalities:  None    Recommendations:  PT exercise    Education:  Patient is aware of cumulative effect of acupuncture      Assessment      Analysis of Treatment:  Very good    Pt prognosis is Good.     Patient will continue to benefit from acupuncture treatment to address the deficits listed in the problem list box on initial evaluation, provide patient family education and to maximize pt's level of independence in the home and community environment.     Patient's spiritual, cultural and educational needs considered and pt agreeable to plan of care and goals.     Anticipated barriers to treatment: None    Plan     Recommend   1   /week for   12   treatments and re-assess.

## 2023-06-23 ENCOUNTER — CLINICAL SUPPORT (OUTPATIENT)
Dept: REHABILITATION | Facility: HOSPITAL | Age: 73
End: 2023-06-23
Payer: OTHER GOVERNMENT

## 2023-06-23 DIAGNOSIS — M25.512 CHRONIC LEFT SHOULDER PAIN: Primary | ICD-10-CM

## 2023-06-23 DIAGNOSIS — G89.29 CHRONIC LEFT SHOULDER PAIN: Primary | ICD-10-CM

## 2023-06-23 PROCEDURE — 97810 ACUP 1/> WO ESTIM 1ST 15 MIN: CPT

## 2023-06-23 PROCEDURE — 97811 ACUP 1/> W/O ESTIM EA ADD 15: CPT

## 2023-07-07 ENCOUNTER — CLINICAL SUPPORT (OUTPATIENT)
Dept: REHABILITATION | Facility: HOSPITAL | Age: 73
End: 2023-07-07
Payer: OTHER GOVERNMENT

## 2023-07-07 DIAGNOSIS — G89.29 NECK PAIN, CHRONIC: ICD-10-CM

## 2023-07-07 DIAGNOSIS — M25.512 CHRONIC LEFT SHOULDER PAIN: Primary | ICD-10-CM

## 2023-07-07 DIAGNOSIS — M54.2 NECK PAIN, CHRONIC: ICD-10-CM

## 2023-07-07 DIAGNOSIS — G89.29 CHRONIC LEFT SHOULDER PAIN: Primary | ICD-10-CM

## 2023-07-07 PROCEDURE — 97811 ACUP 1/> W/O ESTIM EA ADD 15: CPT

## 2023-07-07 PROCEDURE — 97810 ACUP 1/> WO ESTIM 1ST 15 MIN: CPT

## 2023-07-07 NOTE — PROGRESS NOTES
Acupuncture Treatment Note     Name: Cole Winston  [facility] Number: [id number]    Traditional Chinese Medicine Diagnosis:  Qi and Blood stagnated in  Left Shoulder  and in left side of neck    Physician: Order, Paper    Date of Service: 7/7/2023     Medical Diagnosis: Left Shoulder Pain , left side of neck pain  Evaluation Date: 6/3/2022    Plan of Care Certification Period: 20  Visit #/Visits authorized:10 / 20    Precautions: Diabetes    Subjective     Chief Complaint:  Left Shoulder Pain, left side of neck pain    Response to Previous Treatment:  Good  Quality of Symptoms (Better/Worse):  Better    Other Condition/Symptoms:  None    Objective      New Findings:  None    Treatment Principles:  Invigorate the Qi and Blood in Left Shoulder  and in neck    Acupuncture Points:     Left :   Windy around left shoulder +15,  GB 21, Gb 20, Windy on neck + 6    NEEDLES W/O STIM  AT: 10: 50 AM    NEEDLES W/O STIM REMOVED AT: 11: 20 AM    #NEEDLES IN: 23    #NEEDLES OUT: 23    Other Traditional Chinese Medicine Modalities:  None    Recommendations:  PT exercise    Education:  Patient is aware of cumulative effect of acupuncture      Assessment      Analysis of Treatment:  Very good    Pt prognosis is Good.     Patient will continue to benefit from acupuncture treatment to address the deficits listed in the problem list box on initial evaluation, provide patient family education and to maximize pt's level of independence in the home and community environment.     Patient's spiritual, cultural and educational needs considered and pt agreeable to plan of care and goals.     Anticipated barriers to treatment: None    Plan     Recommend   1   /week for   12   treatments and re-assess.

## 2023-09-14 NOTE — PROGRESS NOTES
Acupuncture Treatment Note     Name: Cole Winston  [facility] Number: [id number]    Traditional Chinese Medicine Diagnosis:  Qi and Blood stagnated in  Left Shoulder     Physician: Order, Paper    Date of Service: 6/23/2023     Medical Diagnosis: Left Shoulder Pain   Evaluation Date: 6/3/2022    Plan of Care Certification Period: 20  Visit #/Visits authorized:9 / 20    Precautions: Diabetes    Subjective     Chief Complaint:  Left Shoulder Pain,     Response to Previous Treatment:  Good  Quality of Symptoms (Better/Worse):  Better    Other Condition/Symptoms:  None    Objective      New Findings:  None    Treatment Principles:  Invigorate the Qi and Blood in Left Shoulder     Acupuncture Points:     Left :   Windy around left shoulder +15,  GB 21    NEEDLES W/O STIM  AT: 10: 40 AM    NEEDLES W/O STIM REMOVED AT: 11: 10 AM    #NEEDLES IN: 16    #NEEDLES OUT: 16    Other Traditional Chinese Medicine Modalities:  None    Recommendations:  PT exercise    Education:  Patient is aware of cumulative effect of acupuncture      Assessment      Analysis of Treatment:  Very good    Pt prognosis is Good.     Patient will continue to benefit from acupuncture treatment to address the deficits listed in the problem list box on initial evaluation, provide patient family education and to maximize pt's level of independence in the home and community environment.     Patient's spiritual, cultural and educational needs considered and pt agreeable to plan of care and goals.     Anticipated barriers to treatment: None    Plan     Recommend   1   /week for   12   treatments and re-assess.                                                       
22.3